# Patient Record
(demographics unavailable — no encounter records)

---

## 2025-06-10 NOTE — ASSESSMENT
[FreeTextEntry1] : 57 year F with right knee mild OA - physical therapy and NSAIDs (mobic) was prescribed  - Return in 6 weeks for follow up PRN, MRI if no relief. will need lumbar XR if continues to have back pain.

## 2025-06-10 NOTE — IMAGING
[de-identified] : Constitutional: well developed and well nourished, able to communicate Cardiovascular: Peripheral vascular exam is grossly normal Neurologic: Alert and oriented, no acute distress. Skin: normal skin with no ulcers, rashes, or lesions Pulmonary: No respiratory distress, breathing comfortably on room air Lymphatics: No obvious lymphadenopathy or lymphedema in areas examined  RIGHT KNEE EXAM Alignment: Neutral  Effusion: None Atrophy: None                                                  Stable to Varus/valgus stress Posterior Drawer Test: negative Anterior Drawer Test: Negative Knee Extension/Flexion: 0 / 120  Medial/lateral compartments Medial joint line: mild Tenderness Lateral joint line: noTenderness Frantz test: negative  Patellofemoral joint Medial patellar facet: no tenderness Patellar grind: Negative  Tendons: Pes Anserine: No tenderness Gerdys Tubercle/ IT Band: No tenderness Quadriceps Tendon: No Tenderness patellar tendon: no Tenderness Tibial tubercle: not tenderness Calf: no Tenderness  Neurovascular exam Muscle strength: 5/5 Sensation to light touch: intact Distal pulses: 2+  IMAGIN/10/2025 Xrays of the Right Knee were taken demonstrating mild medial compartment OA